# Patient Record
Sex: MALE
[De-identification: names, ages, dates, MRNs, and addresses within clinical notes are randomized per-mention and may not be internally consistent; named-entity substitution may affect disease eponyms.]

---

## 2022-01-06 ENCOUNTER — NURSE TRIAGE (OUTPATIENT)
Dept: OTHER | Facility: CLINIC | Age: 57
End: 2022-01-06

## 2022-01-06 NOTE — TELEPHONE ENCOUNTER
Subjective: Caller states my covid test is pending from 1/4. My wife is + covid . My symptom that is concerning now is increased chest congestion and cough that started last night    Current Symptoms: up all last night coughing, feels lethargic, feels \"rattle\" in chest, hoarse voice    Onset:last night , symptoms worsening  Associated Symptoms: drinking , urinating normally   Pain Severity: 0/10; N/A; none    Temperature: 97.4 orally    What has been tried: none   Denies cardiac or resp history     Recommended disposition: be seen today    Care advice provided, patient verbalizes understanding; denies any other questions or concerns; instructed to call back for any new or worsening symptoms. Patient/caller proceeding to nearest [facility]     This triage is a result of a call to [address] [place] [address]. Please do not respond to the triage nurse through this encounter. Any subsequent communication should be directly with the patient.       Reason for Disposition   SEVERE coughing spells (e.g., whooping sound after coughing, vomiting after coughing)    Protocols used: COUGH-ADULT-[place]